# Patient Record
Sex: MALE | Race: WHITE
[De-identification: names, ages, dates, MRNs, and addresses within clinical notes are randomized per-mention and may not be internally consistent; named-entity substitution may affect disease eponyms.]

---

## 2020-03-01 ENCOUNTER — HOSPITAL ENCOUNTER (EMERGENCY)
Dept: HOSPITAL 54 - ER | Age: 25
Discharge: HOME | End: 2020-03-01
Payer: COMMERCIAL

## 2020-03-01 VITALS — SYSTOLIC BLOOD PRESSURE: 112 MMHG | DIASTOLIC BLOOD PRESSURE: 85 MMHG

## 2020-03-01 VITALS — HEIGHT: 70 IN | BODY MASS INDEX: 25.34 KG/M2 | WEIGHT: 177 LBS

## 2020-03-01 DIAGNOSIS — N50.811: Primary | ICD-10-CM

## 2020-03-01 DIAGNOSIS — Z98.890: ICD-10-CM

## 2020-03-01 LAB
APPEARANCE UR: CLEAR
BILIRUB UR QL STRIP: NEGATIVE
COLOR UR: YELLOW
GLUCOSE UR STRIP-MCNC: NEGATIVE MG/DL
HGB UR QL STRIP: NEGATIVE ERY/UL
KETONES UR STRIP-MCNC: NEGATIVE MG/DL
LEUKOCYTE ESTERASE UR QL STRIP: NEGATIVE
NITRITE UR QL STRIP: NEGATIVE
PH UR STRIP: 6.5 [PH] (ref 5–8)
PROT UR QL STRIP: NEGATIVE MG/DL
UROBILINOGEN UR STRIP-MCNC: 0.2 EU/DL

## 2020-03-01 NOTE — NUR
PT CAME IN FOR ON AND OFF TESTICULAR PAIN X 1 MONTH. PT  AAOX4, AMBULATORY, 
VSS, RR EVEN AND UNLABORED ON RA W/ NAD NOTED. PT CONNECTED TO THE MONITOR AND 
POX

## 2023-09-06 NOTE — NUR
URINE COLLECTED AND SENT TO LAB Plan: Apply OTC sunscreen 30+ SPF or greater daily to sun exposed areas. Detail Level: Zone